# Patient Record
Sex: FEMALE | Race: BLACK OR AFRICAN AMERICAN | NOT HISPANIC OR LATINO
[De-identification: names, ages, dates, MRNs, and addresses within clinical notes are randomized per-mention and may not be internally consistent; named-entity substitution may affect disease eponyms.]

---

## 2020-11-09 ENCOUNTER — APPOINTMENT (OUTPATIENT)
Dept: SURGERY | Facility: CLINIC | Age: 45
End: 2020-11-09
Payer: COMMERCIAL

## 2020-11-09 VITALS
TEMPERATURE: 98.2 F | SYSTOLIC BLOOD PRESSURE: 151 MMHG | DIASTOLIC BLOOD PRESSURE: 85 MMHG | WEIGHT: 293 LBS | OXYGEN SATURATION: 98 % | HEART RATE: 77 BPM | BODY MASS INDEX: 41.48 KG/M2 | HEIGHT: 70.5 IN

## 2020-11-09 PROCEDURE — 99203 OFFICE O/P NEW LOW 30 MIN: CPT

## 2020-11-25 NOTE — PHYSICAL EXAM
[JVD] : no jugular venous distention  [Normal Breath Sounds] : Normal breath sounds [Normal Heart Sounds] : normal heart sounds [Abdominal Masses] : No abdominal masses [Abdomen Tenderness] : ~T ~M No abdominal tenderness [Tender] : was nontender [Enlarged] : not enlarged [Purpura] : no purpura  [Alert] : alert [Oriented to Person] : oriented to person [Oriented to Place] : oriented to place [Oriented to Time] : oriented to time [Calm] : calm [de-identified] : JOSHUA. Lesia. Appropriate. Comfortable. [de-identified] : Pupils equal. No Scleral Icterus. NCAT. +COVID-19 mask. [de-identified] : Supple. Trachea midline. No overt lymphadenopathy. No JVD [de-identified] : Abdomen is soft, non tender and non distended. Do not appreciate any overt mass. There is reducible hernia. Midline. No tenderness. Exam limited by body habitus.

## 2020-11-25 NOTE — HISTORY OF PRESENT ILLNESS
[de-identified] : This is a 45 year old female with a history of longstanding ventral hernia. She has a history of ventral/umbilical hernia repair in 2000. She reports noting a bulge in the upper area of the abdomen for some time. No obstructive symptoms but wishes to have it checked out. Hx of fibroids in the past. Hx of recurrent UTI. She suffers from morbid obesity with a BMI of 44, as well as chronic anemia.

## 2020-11-25 NOTE — ASSESSMENT
[FreeTextEntry1] : Pleasant 45 year old female with history of ventral hernia. She is a current everyday smoker and has a body mass index of 44. We discussed that at the current weight that surgery to repair has very high complication risk. GIven that she has had difficulty with weight for some time, I have recommended consultation with bariatric surgery for weight loss surgery in an effort to improve her overall health prior to any attempt at recurrent hernia repair or abdominal wall reconstruction. Notably greater than 50% of todays 30 minute initial visit was spent on counseling and coordination of her care. I have referred her to Dr. Chris Albrecht for evaluation.

## 2020-12-02 ENCOUNTER — APPOINTMENT (OUTPATIENT)
Dept: BARIATRICS | Facility: CLINIC | Age: 45
End: 2020-12-02

## 2021-05-07 ENCOUNTER — APPOINTMENT (OUTPATIENT)
Dept: OPHTHALMOLOGY | Facility: CLINIC | Age: 46
End: 2021-05-07

## 2021-08-31 ENCOUNTER — EMERGENCY (EMERGENCY)
Facility: HOSPITAL | Age: 46
LOS: 1 days | Discharge: HOME | End: 2021-08-31
Attending: EMERGENCY MEDICINE | Admitting: EMERGENCY MEDICINE
Payer: COMMERCIAL

## 2021-08-31 VITALS
RESPIRATION RATE: 18 BRPM | SYSTOLIC BLOOD PRESSURE: 165 MMHG | HEIGHT: 70 IN | DIASTOLIC BLOOD PRESSURE: 85 MMHG | HEART RATE: 108 BPM | TEMPERATURE: 98 F | OXYGEN SATURATION: 98 %

## 2021-08-31 DIAGNOSIS — M79.672 PAIN IN LEFT FOOT: ICD-10-CM

## 2021-08-31 DIAGNOSIS — Z88.1 ALLERGY STATUS TO OTHER ANTIBIOTIC AGENTS STATUS: ICD-10-CM

## 2021-08-31 DIAGNOSIS — M21.612 BUNION OF LEFT FOOT: ICD-10-CM

## 2021-08-31 PROCEDURE — 96372 THER/PROPH/DIAG INJ SC/IM: CPT

## 2021-08-31 PROCEDURE — 99283 EMERGENCY DEPT VISIT LOW MDM: CPT | Mod: 25

## 2021-08-31 PROCEDURE — 73630 X-RAY EXAM OF FOOT: CPT

## 2021-08-31 PROCEDURE — 99284 EMERGENCY DEPT VISIT MOD MDM: CPT

## 2021-08-31 PROCEDURE — 73630 X-RAY EXAM OF FOOT: CPT | Mod: 26,LT

## 2021-08-31 RX ORDER — KETOROLAC TROMETHAMINE 30 MG/ML
30 SYRINGE (ML) INJECTION ONCE
Refills: 0 | Status: DISCONTINUED | OUTPATIENT
Start: 2021-08-31 | End: 2021-08-31

## 2021-08-31 RX ORDER — IBUPROFEN 200 MG
1 TABLET ORAL
Qty: 30 | Refills: 0
Start: 2021-08-31

## 2021-08-31 RX ADMIN — Medication 30 MILLIGRAM(S): at 07:58

## 2021-08-31 RX ADMIN — Medication 30 MILLIGRAM(S): at 08:58

## 2021-08-31 NOTE — ED PROVIDER NOTE - OBJECTIVE STATEMENT
46 year old female with history of left foot bunion presents to ED with concern for bunion causing her discomfort over the past several days.  Patient states she has been shifting her weight and walking with a cane to keep pressure off of the painful area.  She denies direct trauma to area, paresthesias, inability to ambulate, fever, chills, pain to ankle/lower leg or any additional acute complaints or concerns at this time.  She took tylenol yesterday for her pain with minimal improvement in symptoms.

## 2021-08-31 NOTE — ED PROVIDER NOTE - NSFOLLOWUPINSTRUCTIONS_ED_ALL_ED_FT
Please follow up with podiatry team as well as your primary physician in 1-2 days for re evaluation.  Please return to ER immediately should your symptoms worsen or if you have any concern prior to this recommended follow up.          Bunion    WHAT YOU NEED TO KNOW:    A bunion is a bony lump at the base of your big toe. As it grows, it sticks out from the side of your foot and may move your toe out of place.    Bunion         DISCHARGE INSTRUCTIONS:    Return to the emergency department if:   •You have severe pain in your toe.      •You cannot put weight on your foot.      Call your doctor if:   •You cannot do your daily activities because of the pain.      •You have questions or concerns about your condition or care.      Medicines: You may need any of the following:  •Acetaminophen decreases pain and fever. It is available without a doctor's order. Ask how much to take and how often to take it. Follow directions. Read the labels of all other medicines you are using to see if they also contain acetaminophen, or ask your doctor or pharmacist. Acetaminophen can cause liver damage if not taken correctly. Do not use more than 4 grams (4,000 milligrams) total of acetaminophen in one day.       •NSAIDs, such as ibuprofen, help decrease swelling, pain, and fever. This medicine is available with or without a doctor's order. NSAIDs can cause stomach bleeding or kidney problems in certain people. If you take blood thinner medicine, always ask your healthcare provider if NSAIDs are safe for you. Always read the medicine label and follow directions.      •Take your medicine as directed. Contact your healthcare provider if you think your medicine is not helping or if you have side effects. Tell him of her if you are allergic to any medicine. Keep a list of the medicines, vitamins, and herbs you take. Include the amounts, and when and why you take them. Bring the list or the pill bottles to follow-up visits. Carry your medicine list with you in case of an emergency.      Self-care:   •Use a bunion pad. Wear a thick, ring-shaped pad around and over the bunion to cushion it.      •Wear shoes that fit well. Wear wide, low-heeled shoes that have plenty of room for your toes. Do not wear tight shoes or heels that are higher than 2 inches.      •Wear shoe inserts or arch supports. These will decrease pressure on the bunion.      •Separate your big toe at night. Separate the big toe from the others with a foam pad while you sleep. Use a light elastic bandage to keep the pad in place.      •Stretch your foot each day. This will help decrease pressure and increase foot strength. Ask what foot exercises are best for you.      •Apply ice on your toe for 15 to 20 minutes every hour or as directed. Use an ice pack or put crushed ice in a plastic bag. Cover it with a towel. Ice helps prevent tissue damage and decreases swelling and pain.      •Go to physical therapy if directed. A physical therapist teaches you exercises to help improve movement and strength, and to decrease pain.      Follow up with your doctor as directed: You may be referred to a podiatrist (foot specialist). Write down your questions so you remember to ask them during your visits.        © Copyright [employer] 2021           back to top                          © Copyright [employer] 2021

## 2021-08-31 NOTE — ED PROVIDER NOTE - PHYSICAL EXAMINATION
VITAL SIGNS: I have reviewed nursing notes and confirm.  CONSTITUTIONAL: Non toxic; in no acute distress.   SKIN:  Warm and dry, no acute rash.   HEAD:  Normocephalic, atraumatic.  EYES: PERRL.  EOM intact; conjunctiva and sclera clear.  ENT: No nasal discharge; airway clear.   NECK: Supple; non tender.  CARD: S1, S2 normal; no murmurs, gallops, or rubs. Regular rate and rhythm.   RESP:  Clear to auscultation b/l, no wheezes, rales or rhonchi.  ABD: Normal bowel sounds; soft; non-distended; non-tender; no guarding/rebound.  EXT: + TTP over medial aspect of left distal foot with mild overlying erythema, no skin breakdown, no fluctuance.  No pain on palpation to ankle/mid/proximal foot, tib/fib on affected LLE.  Normal ROM. No clubbing, cyanosis or edema. 2+ pulses to b/l ue/le.  NEURO: Alert, oriented, grossly unremarkable.  5/5 strength x 4 extremities against gravity and external force.  No drift x 4 extremities.  Sensation intact and symmetric x 4 extremities.  No facial asymmetry.    PSYCH: Cooperative, mood and affect appropriate.

## 2021-08-31 NOTE — ED PROVIDER NOTE - NSFOLLOWUPCLINICS_GEN_ALL_ED_FT
[facility] Podiatry [facility]  Podiatry  [address] [place] [address]  Phone: [phone]  Fax:

## 2021-08-31 NOTE — ED ADULT NURSE NOTE - OBJECTIVE STATEMENT
46Y Female A&OX3 c/o Left foot pain starting Saturday. Pain worse with ambulation. Lefty big toe Hallux noted to be swollen and erythematous. Pt has a hx of Gout. Last "many years ago". Pt able to ambulate with a guerra. Edema noted to Left lower extremity. Denies numbness, tingling, fever, chills, cough, trauma, nor chest pain. Plus 2 bilateral pedal pulses.

## 2021-08-31 NOTE — ED PROVIDER NOTE - PATIENT PORTAL LINK FT
You can access the FollowMyHealth Patient Portal offered by [facility] by registering at the following website: [email or web address]. By joining [employer]’s FollowMyHealth portal, you will also be able to view your health information using other applications (apps) compatible with our system.

## 2021-08-31 NOTE — ED PROVIDER NOTE - CLINICAL SUMMARY MEDICAL DECISION MAKING FREE TEXT BOX
Patient presents to ED with concern for left foot pain over area of known bunion over the past several days.  Neurovasc status intact, ambulalting with cane to offset discomfort to medial distal left foot.  Patient is given toradol in ED and x ray left foot completed.  No acute process on imaging.  Will provide follow up information for podiatry, rx for motrin, post op shoe to help distribute weight and recommend prompt follow up for re eval with specialist.  Patient is advised to follow up with podiatrist as well as their PCP in 1-2 days without fail.  Patient instructed to return to ED immediately should their symptoms worsen or if there is any concern prior to the recommended follow up.  Patient is aware of plan and verbalizes their understanding.  Will discharge home at this time.

## 2021-08-31 NOTE — ED PROVIDER NOTE - NS ED ROS FT
Constitutional: No fever or chills.   Eyes: No pain, blurry vision, or discharge.  ENMT: No hearing changes, pain, discharge or infections. No neck pain or stiffness.  Cardiac: No chest pain, SOB or edema. No chest pain with exertion.  Respiratory: No cough or respiratory distress. No hemoptysis. No history of asthma or RAD.  GI: No nausea, vomiting, diarrhea or abdominal pain.  : No dysuria, frequency or burning.  MS: + Left foot pain.  No back or neck pain.    Neuro: No headache or weakness. No LOC.  Skin: No skin rash.   Endocrine: No history of thyroid disease or diabetes.  Except as documented in the HPI, all other systems are negative.

## 2021-11-04 ENCOUNTER — APPOINTMENT (OUTPATIENT)
Dept: PULMONOLOGY | Facility: CLINIC | Age: 46
End: 2021-11-04

## 2022-05-02 ENCOUNTER — NON-APPOINTMENT (OUTPATIENT)
Age: 47
End: 2022-05-02

## 2022-05-02 ENCOUNTER — APPOINTMENT (OUTPATIENT)
Dept: OPHTHALMOLOGY | Facility: CLINIC | Age: 47
End: 2022-05-02
Payer: COMMERCIAL

## 2022-05-02 PROCEDURE — 92004 COMPRE OPH EXAM NEW PT 1/>: CPT

## 2022-05-20 ENCOUNTER — APPOINTMENT (OUTPATIENT)
Age: 47
End: 2022-05-20
Payer: COMMERCIAL

## 2022-05-20 ENCOUNTER — NON-APPOINTMENT (OUTPATIENT)
Age: 47
End: 2022-05-20

## 2022-05-20 ENCOUNTER — APPOINTMENT (OUTPATIENT)
Dept: OPHTHALMOLOGY | Facility: CLINIC | Age: 47
End: 2022-05-20
Payer: COMMERCIAL

## 2022-05-20 PROCEDURE — 99202 OFFICE O/P NEW SF 15 MIN: CPT | Mod: 95

## 2022-05-20 PROCEDURE — 92014 COMPRE OPH EXAM EST PT 1/>: CPT

## 2022-05-23 NOTE — CONSULT LETTER
[Dear  ___] : Dear  [unfilled], [Consult Letter:] : I had the pleasure of evaluating your patient, [unfilled]. [Please see my note below.] : Please see my note below. [Consult Closing:] : Thank you very much for allowing me to participate in the care of this patient.  If you have any questions, please do not hesitate to contact me. [Sincerely,] : Sincerely, [FreeTextEntry3] : Benji Stone MD, FSIR\par Chief Interventional Radiology\par [facility]\par [facility]\par

## 2022-05-23 NOTE — ASSESSMENT
[FreeTextEntry1] : 46 year old with heavy irregular periods in setting of adenomyosis and uterine fibroids.  Patient is a candidate for UAE.  Will obtain clearance from PMD prior to procedure given history and comorbidities

## 2022-05-23 NOTE — HISTORY OF PRESENT ILLNESS
[FreeTextEntry1] : Tosin Solomon is a 46 year old  with over 10 year history of menorrhagia secondary to adenomyosis and fibroids resulting in anemia requiring iron infusion.  Also reports urinary frequency nocturia and pelvic pressure.   MRI shows dominant adenomyosis with several small fibroids.  Pap negative. [Home] : at home, [unfilled] , at the time of the visit. [Medical Office: ([facility])___] : at the medical office located in  [Verbal consent obtained from patient] : the patient, [unfilled] [Menorrhagia] : ~T menorrhagia [Urinary Frequency] : urinary frequency [Pressure] : pressure [Bloating] : bloating [Myomectomy] : no myomectomy [UAE] : no uterine artery embolization [Hysteroscopy] : no hysteroscopy [Last Menstrual Period (___)] : Last menstrual period [unfilled] [Monthly Cycles Regular] : monthly cycles are not regular [Lesions/ Discharge] : no lesions and or discharge [Menopausal Symptoms] : no complaints of menopausal symptoms [Post Menopause] : not post menopausal [G ___] : G [unfilled] [P___] : P [unfilled] [Vaginal___] : vaginal [unfilled] [Plans for future pregnancies within 2 years] : does not plan to have future pregnancies within 2 years [Surgically Sterile] : not surgically sterile [Stable] : stable

## 2022-11-03 ENCOUNTER — APPOINTMENT (OUTPATIENT)
Dept: BARIATRICS | Facility: CLINIC | Age: 47
End: 2022-11-03

## 2023-01-23 ENCOUNTER — APPOINTMENT (OUTPATIENT)
Dept: SURGERY | Facility: CLINIC | Age: 48
End: 2023-01-23

## 2023-03-19 ENCOUNTER — EMERGENCY (EMERGENCY)
Facility: HOSPITAL | Age: 48
LOS: 1 days | Discharge: HOME | End: 2023-03-19
Attending: EMERGENCY MEDICINE | Admitting: EMERGENCY MEDICINE
Payer: COMMERCIAL

## 2023-03-19 VITALS
WEIGHT: 293 LBS | TEMPERATURE: 98 F | OXYGEN SATURATION: 98 % | DIASTOLIC BLOOD PRESSURE: 88 MMHG | HEIGHT: 70 IN | RESPIRATION RATE: 18 BRPM | SYSTOLIC BLOOD PRESSURE: 145 MMHG | HEART RATE: 98 BPM

## 2023-03-19 VITALS
OXYGEN SATURATION: 96 % | DIASTOLIC BLOOD PRESSURE: 75 MMHG | SYSTOLIC BLOOD PRESSURE: 150 MMHG | TEMPERATURE: 98 F | RESPIRATION RATE: 18 BRPM | HEART RATE: 55 BPM

## 2023-03-19 LAB
ALBUMIN SERPL ELPH-MCNC: 3.8 G/DL — SIGNIFICANT CHANGE UP (ref 3.3–5)
ALP SERPL-CCNC: 71 U/L — SIGNIFICANT CHANGE UP (ref 40–120)
ALT FLD-CCNC: 15 U/L — SIGNIFICANT CHANGE UP (ref 10–45)
ANION GAP SERPL CALC-SCNC: 9 MMOL/L — SIGNIFICANT CHANGE UP (ref 5–17)
APPEARANCE UR: CLEAR — SIGNIFICANT CHANGE UP
AST SERPL-CCNC: 14 U/L — SIGNIFICANT CHANGE UP (ref 10–40)
BACTERIA # UR AUTO: PRESENT /HPF
BASOPHILS # BLD AUTO: 0.03 K/UL — SIGNIFICANT CHANGE UP (ref 0–0.2)
BASOPHILS NFR BLD AUTO: 0.5 % — SIGNIFICANT CHANGE UP (ref 0–2)
BILIRUB SERPL-MCNC: 0.2 MG/DL — SIGNIFICANT CHANGE UP (ref 0.2–1.2)
BILIRUB UR-MCNC: NEGATIVE — SIGNIFICANT CHANGE UP
BUN SERPL-MCNC: 13 MG/DL — SIGNIFICANT CHANGE UP (ref 7–23)
CALCIUM SERPL-MCNC: 9.2 MG/DL — SIGNIFICANT CHANGE UP (ref 8.4–10.5)
CHLORIDE SERPL-SCNC: 103 MMOL/L — SIGNIFICANT CHANGE UP (ref 96–108)
CO2 SERPL-SCNC: 27 MMOL/L — SIGNIFICANT CHANGE UP (ref 22–31)
COLOR SPEC: YELLOW — SIGNIFICANT CHANGE UP
CREAT SERPL-MCNC: 0.74 MG/DL — SIGNIFICANT CHANGE UP (ref 0.5–1.3)
DIFF PNL FLD: ABNORMAL
EGFR: 100 ML/MIN/1.73M2 — SIGNIFICANT CHANGE UP
EOSINOPHIL # BLD AUTO: 0.1 K/UL — SIGNIFICANT CHANGE UP (ref 0–0.5)
EOSINOPHIL NFR BLD AUTO: 1.6 % — SIGNIFICANT CHANGE UP (ref 0–6)
EPI CELLS # UR: SIGNIFICANT CHANGE UP /HPF (ref 0–5)
GLUCOSE SERPL-MCNC: 145 MG/DL — HIGH (ref 70–99)
GLUCOSE UR QL: NEGATIVE — SIGNIFICANT CHANGE UP
HCT VFR BLD CALC: 39 % — SIGNIFICANT CHANGE UP (ref 34.5–45)
HGB BLD-MCNC: 12.6 G/DL — SIGNIFICANT CHANGE UP (ref 11.5–15.5)
IMM GRANULOCYTES NFR BLD AUTO: 0.2 % — SIGNIFICANT CHANGE UP (ref 0–0.9)
KETONES UR-MCNC: NEGATIVE — SIGNIFICANT CHANGE UP
LEUKOCYTE ESTERASE UR-ACNC: NEGATIVE — SIGNIFICANT CHANGE UP
LIDOCAIN IGE QN: 15 U/L — SIGNIFICANT CHANGE UP (ref 7–60)
LYMPHOCYTES # BLD AUTO: 2.12 K/UL — SIGNIFICANT CHANGE UP (ref 1–3.3)
LYMPHOCYTES # BLD AUTO: 33.2 % — SIGNIFICANT CHANGE UP (ref 13–44)
MCHC RBC-ENTMCNC: 25.1 PG — LOW (ref 27–34)
MCHC RBC-ENTMCNC: 32.3 GM/DL — SIGNIFICANT CHANGE UP (ref 32–36)
MCV RBC AUTO: 77.7 FL — LOW (ref 80–100)
MONOCYTES # BLD AUTO: 0.35 K/UL — SIGNIFICANT CHANGE UP (ref 0–0.9)
MONOCYTES NFR BLD AUTO: 5.5 % — SIGNIFICANT CHANGE UP (ref 2–14)
NEUTROPHILS # BLD AUTO: 3.77 K/UL — SIGNIFICANT CHANGE UP (ref 1.8–7.4)
NEUTROPHILS NFR BLD AUTO: 59 % — SIGNIFICANT CHANGE UP (ref 43–77)
NITRITE UR-MCNC: NEGATIVE — SIGNIFICANT CHANGE UP
NRBC # BLD: 0 /100 WBCS — SIGNIFICANT CHANGE UP (ref 0–0)
PH UR: 7 — SIGNIFICANT CHANGE UP (ref 5–8)
PLATELET # BLD AUTO: 299 K/UL — SIGNIFICANT CHANGE UP (ref 150–400)
POTASSIUM SERPL-MCNC: 4.8 MMOL/L — SIGNIFICANT CHANGE UP (ref 3.5–5.3)
POTASSIUM SERPL-SCNC: 4.8 MMOL/L — SIGNIFICANT CHANGE UP (ref 3.5–5.3)
PROT SERPL-MCNC: 6.9 G/DL — SIGNIFICANT CHANGE UP (ref 6–8.3)
PROT UR-MCNC: NEGATIVE MG/DL — SIGNIFICANT CHANGE UP
RBC # BLD: 5.02 M/UL — SIGNIFICANT CHANGE UP (ref 3.8–5.2)
RBC # FLD: 15.6 % — HIGH (ref 10.3–14.5)
RBC CASTS # UR COMP ASSIST: > 10 /HPF
SODIUM SERPL-SCNC: 139 MMOL/L — SIGNIFICANT CHANGE UP (ref 135–145)
SP GR SPEC: 1.02 — SIGNIFICANT CHANGE UP (ref 1–1.03)
UROBILINOGEN FLD QL: 0.2 E.U./DL — SIGNIFICANT CHANGE UP
WBC # BLD: 6.38 K/UL — SIGNIFICANT CHANGE UP (ref 3.8–10.5)
WBC # FLD AUTO: 6.38 K/UL — SIGNIFICANT CHANGE UP (ref 3.8–10.5)
WBC UR QL: < 5 /HPF — SIGNIFICANT CHANGE UP

## 2023-03-19 PROCEDURE — 76705 ECHO EXAM OF ABDOMEN: CPT | Mod: 26

## 2023-03-19 PROCEDURE — 83690 ASSAY OF LIPASE: CPT

## 2023-03-19 PROCEDURE — 99284 EMERGENCY DEPT VISIT MOD MDM: CPT

## 2023-03-19 PROCEDURE — 81001 URINALYSIS AUTO W/SCOPE: CPT

## 2023-03-19 PROCEDURE — 96374 THER/PROPH/DIAG INJ IV PUSH: CPT

## 2023-03-19 PROCEDURE — 80053 COMPREHEN METABOLIC PANEL: CPT

## 2023-03-19 PROCEDURE — 85025 COMPLETE CBC W/AUTO DIFF WBC: CPT

## 2023-03-19 PROCEDURE — 76705 ECHO EXAM OF ABDOMEN: CPT

## 2023-03-19 PROCEDURE — 36415 COLL VENOUS BLD VENIPUNCTURE: CPT

## 2023-03-19 PROCEDURE — 99284 EMERGENCY DEPT VISIT MOD MDM: CPT | Mod: 25

## 2023-03-19 RX ORDER — OXYCODONE AND ACETAMINOPHEN 5; 325 MG/1; MG/1
1 TABLET ORAL
Qty: 9 | Refills: 0
Start: 2023-03-19 | End: 2023-03-21

## 2023-03-19 RX ORDER — SODIUM CHLORIDE 9 MG/ML
1000 INJECTION INTRAMUSCULAR; INTRAVENOUS; SUBCUTANEOUS ONCE
Refills: 0 | Status: COMPLETED | OUTPATIENT
Start: 2023-03-19 | End: 2023-03-19

## 2023-03-19 RX ORDER — ACETAMINOPHEN 500 MG
1000 TABLET ORAL ONCE
Refills: 0 | Status: COMPLETED | OUTPATIENT
Start: 2023-03-19 | End: 2023-03-19

## 2023-03-19 RX ORDER — ONDANSETRON 8 MG/1
1 TABLET, FILM COATED ORAL
Qty: 9 | Refills: 0
Start: 2023-03-19 | End: 2023-03-21

## 2023-03-19 RX ORDER — FAMOTIDINE 10 MG/ML
1 INJECTION INTRAVENOUS
Qty: 14 | Refills: 0
Start: 2023-03-19 | End: 2023-03-25

## 2023-03-19 RX ADMIN — Medication 400 MILLIGRAM(S): at 13:31

## 2023-03-19 RX ADMIN — SODIUM CHLORIDE 1000 MILLILITER(S): 9 INJECTION INTRAMUSCULAR; INTRAVENOUS; SUBCUTANEOUS at 13:30

## 2023-03-19 NOTE — ED PROVIDER NOTE - PATIENT PORTAL LINK FT
You can access the FollowMyHealth Patient Portal offered by [facility] by registering at the following website: [email or web address]. By joining [employer]’s FollowMyHealth portal, you will also be able to view your health information using other applications (apps) compatible with our system.

## 2023-03-19 NOTE — ED PROVIDER NOTE - ATTENDING APP SHARED VISIT CONTRIBUTION OF CARE
48 yo F h/o gb adenomyosis c/o severe RUQ pain starting overnight and progressing this am w n/v, no change in bowel habits.  Pain feels "like acid pouring on me" w burning pain.  No dysuria, hematuria.  Pt reports meeting w a surgeon - no stones but + adenomyosis and no indication for cholecystectomy at time of eval.  Pt reports she ate fried fish last night.  No fever.  Well appearing, nad, nc/at, lung cta, heart reg, abd soft, RUQ ttp, ext no gross deformity, no gross neuro deficits  Pt c/o ruq pain, h/o adenomyosis; sx concerning for biliary colic vs cholecystitis.  No sig concern for kidney stone, uti, pyelo.  Plan labs, pain meds, u/s; reassess.

## 2023-03-19 NOTE — ED PROVIDER NOTE - NSFOLLOWUPINSTRUCTIONS_ED_ALL_ED_FT
[place] Diet  A bland diet may consist of soft foods or foods that are not high in fat or are not greasy, acidic, or spicy. Avoiding certain foods may cause less irritation to your mouth, throat, stomach, or gastrointestinal tract. Avoiding certain foods may make you feel better. Everyone's tolerances are different. A bland diet should be based on what you can tolerate and what may cause discomfort.  What is my plan  Your health care provider or dietitian may recommend specific changes to your diet to treat your symptoms. These changes may include:  •Eating small meals frequently.  •Cooking food until it is soft enough to chew easily.  •Taking the time to chew your food thoroughly, so it is easy to swallow and digest  •Avoiding foods that cause you discomfort. These may include spicy food, fried food, greasy foods, hard-to-chew foods, or citrus fruits and juices.  •Drinking slowly.  What are tips for following this plan?  Reading food labels   •To reduce fiber intake, look for food labels that say "whole," such as whole wheat or whole grain.  Shopping   •Avoid food items that may have nuts or seeds.  •Avoid vegetables that may make you gassy or have a tough texture, such as broccoli, cauliflower, or corn.  Cooking   •Cook foods thoroughly so they have a soft texture.  Meal planning   •Make sure you include foods from all food groups to eat a balanced diet.  •Eat a variety of types of foods.  •Eat foods and drink beverages that do not cause you discomfort. These may include soups and broths with cooked meats, pasta, and vegetables.  Lifestyle   •Sit up after meals, avoid tight clothing, and take time to eat and chew your food slowly.  •Ask your health care provider whether you should take dietary supplements.  General information   •Mildly season your foods. Some seasonings, such as cayenne pepper, vinegar, or hot sauce, may cause irritation.  •The foods, beverages, or seasonings to avoid should be based on individual tolerance.  What foods should I eat?  Fruits   Canned or cooked fruit such as peaches, pears, or applesauce. Bananas.  Vegetables   Well-cooked vegetables. Canned or cooked vegetables such as carrots, green beans, beets, or spinach. Mashed or boiled potatoes.  Grains   Bread, rice, and cereal from the grain group.   Hot cereals, such as cream of wheat and processed oatmeal. Rice. Bread, crackers, pasta, or tortillas made from refined white flour.  Meats and other proteins   Chicken, fish, and eggs.   Eggs. Creamy peanut butter or other nut butters. Lean, well-cooked tender meats, such as beef, pork, chicken, or fish  Dairy   Low-fat dairy products such as milk, cottage cheese, or yogurt.  Beverages   A cup of hot tea.   Water. Herbal tea. Apple juice.  Fats and oils   Mild salad dressings. Canola or olive oil.  Sweets and desserts   Low-fat pudding, custard, or ice cream. Fruit gelatin.  The items listed above may not be a complete list of foods and beverages you can eat. Contact a dietitian for more information.   What foods should I avoid?  Fruits   Citrus fruits, such as oranges and grapefruit. Fruits with a stringy texture. Fruits that have lots of seeds, such as kiwi or strawberries. Dried fruits.  Vegetables  Raw, uncooked vegetables. Salads.  Grains   Whole grain breads, muffins, and cereals.  Meats and other proteins   Tough, fibrous meats. Highly seasoned meat such as corned beef, smoked meats, or fish. Processed high-fat meats such as brats, hot dogs, or sausage.  Dairy  Full-fat dairy foods such as ice cream and cheese.  Beverages   Caffeinated drinks. Alcohol  Seasonings and condiments   Strongly flavored seasonings or condiments. Hot sauce. Salsa.  Other foods   Spicy foods. Fried or greasy foods. Sour foods, such as pickled or fermented foods like sauerkraut. Foods high in fiber.  The items listed above may not be a complete list of foods and beverages you should avoid. Contact a dietitian for more information.   Summary  •A bland diet should be based on individual tolerance. It may consist of foods that are soft textured and do not have a lot of fat, fiber, acid, or seasonings.  •A bland diet may be recommended because avoiding certain foods, beverages, or spices may make you feel better.

## 2023-03-19 NOTE — ED PROVIDER NOTE - NS ED ATTENDING STATEMENT MOD
This was a shared visit with the FRANCINE. I reviewed and verified the documentation and independently performed the documented:

## 2023-03-19 NOTE — ED ADULT NURSE NOTE - OBJECTIVE STATEMENT
Pt. a&ox4 ambulatory with steady gait, hx of cholecystitis 3 yrs ago, mild exacerbation in January, hx of HTN and DM2, not on any meds, comes to ED c/o RUQ abdominal pain that began last night and intensified into this am, with + nausea and increased GERD / flatulence, made worse with eating and drinking, tender to palpation, and described as a constant throbbing sensation, feels similarly to exacerbation 3 yrs ago. Pt. denies radiation of pain, v/d, urinary s/s, cp, SOB, f/c, BA. Pt. notes she in on day 2 of Augmentin that UC prescribed for swollen glands pt. has had for last week. Pt. recalls she was also on Augmentin when cholecystitis occurred 3 yr ago. Abdomen is soft, tender to palpation.

## 2023-03-19 NOTE — ED PROVIDER NOTE - OBJECTIVE STATEMENT
The pt is 46 y/o F, who presents to ED c/o R sided abd pain since last night. Pain is 9/10, cramp like, + nausea, has not taken any pain meds, pain radiates to R upper back, ate fried fish and pasta salad last night, hx of cholecystitis in past (idiopathic - no gallstones). Denies fevers, chills, cp, sob, emesis, diarrhea, anorexia, dysuria.

## 2023-03-19 NOTE — ED PROVIDER NOTE - CLINICAL SUMMARY MEDICAL DECISION MAKING FREE TEXT BOX
pt c/o ruq pain after eating fried fish and pasta salad, + nausea, hx of idiopathic cholecystitis in past. no fever, no emesis, pt well appearing, neg gonzáles's sign, labs wnl, no cva tend, ua neg for inf, us neg for gallstones and no renal calculi or hydro. pain controlled w/iv tyl, stable for dc, bland diet and gi f/u discussed, pt understands and agrees w/plan, strict return precautions given

## 2023-03-23 DIAGNOSIS — Z88.1 ALLERGY STATUS TO OTHER ANTIBIOTIC AGENTS STATUS: ICD-10-CM

## 2023-03-23 DIAGNOSIS — Z87.440 PERSONAL HISTORY OF URINARY (TRACT) INFECTIONS: ICD-10-CM

## 2023-03-23 DIAGNOSIS — R11.0 NAUSEA: ICD-10-CM

## 2023-03-23 DIAGNOSIS — R10.11 RIGHT UPPER QUADRANT PAIN: ICD-10-CM

## 2023-03-23 DIAGNOSIS — Z87.42 PERSONAL HISTORY OF OTHER DISEASES OF THE FEMALE GENITAL TRACT: ICD-10-CM
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
## 2023-03-23 DIAGNOSIS — K82.8 OTHER SPECIFIED DISEASES OF GALLBLADDER: ICD-10-CM
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
## 2023-03-23 DIAGNOSIS — R10.9 UNSPECIFIED ABDOMINAL PAIN: ICD-10-CM

## 2023-03-23 DIAGNOSIS — E66.9 OBESITY, UNSPECIFIED: ICD-10-CM

## 2023-03-23 DIAGNOSIS — T78.1XXA OTHER ADVERSE FOOD REACTIONS, NOT ELSEWHERE CLASSIFIED, INITIAL ENCOUNTER: ICD-10-CM

## 2023-03-23 DIAGNOSIS — Z87.19 PERSONAL HISTORY OF OTHER DISEASES OF THE DIGESTIVE SYSTEM: ICD-10-CM

## 2023-07-10 ENCOUNTER — APPOINTMENT (OUTPATIENT)
Dept: SURGERY | Facility: CLINIC | Age: 48
End: 2023-07-10

## 2023-08-10 ENCOUNTER — APPOINTMENT (OUTPATIENT)
Dept: BARIATRICS | Facility: CLINIC | Age: 48
End: 2023-08-10

## 2023-08-16 ENCOUNTER — APPOINTMENT (OUTPATIENT)
Dept: BARIATRICS | Facility: CLINIC | Age: 48
End: 2023-08-16

## 2024-02-05 ENCOUNTER — APPOINTMENT (OUTPATIENT)
Dept: SURGERY | Facility: CLINIC | Age: 49
End: 2024-02-05
Payer: COMMERCIAL

## 2024-02-05 VITALS
HEART RATE: 123 BPM | HEIGHT: 70.5 IN | SYSTOLIC BLOOD PRESSURE: 144 MMHG | OXYGEN SATURATION: 98 % | BODY MASS INDEX: 41.48 KG/M2 | WEIGHT: 293 LBS | DIASTOLIC BLOOD PRESSURE: 84 MMHG | TEMPERATURE: 97.2 F

## 2024-02-05 DIAGNOSIS — E11.9 TYPE 2 DIABETES MELLITUS W/OUT COMPLICATIONS: ICD-10-CM

## 2024-02-05 DIAGNOSIS — D25.9 LEIOMYOMA OF UTERUS, UNSPECIFIED: ICD-10-CM

## 2024-02-05 DIAGNOSIS — I63.9 CEREBRAL INFARCTION, UNSPECIFIED: ICD-10-CM

## 2024-02-05 DIAGNOSIS — Z86.711 PERSONAL HISTORY OF PULMONARY EMBOLISM: ICD-10-CM

## 2024-02-05 DIAGNOSIS — E66.01 MORBID (SEVERE) OBESITY DUE TO EXCESS CALORIES: ICD-10-CM

## 2024-02-05 DIAGNOSIS — Z82.49 FAMILY HISTORY OF ISCHEMIC HEART DISEASE AND OTHER DISEASES OF THE CIRCULATORY SYSTEM: ICD-10-CM

## 2024-02-05 DIAGNOSIS — G93.2 BENIGN INTRACRANIAL HYPERTENSION: ICD-10-CM

## 2024-02-05 DIAGNOSIS — R10.11 RIGHT UPPER QUADRANT PAIN: ICD-10-CM

## 2024-02-05 DIAGNOSIS — K43.2 INCISIONAL HERNIA W/OUT OBSTRUCTION OR GANGRENE: ICD-10-CM

## 2024-02-05 DIAGNOSIS — Z78.9 OTHER SPECIFIED HEALTH STATUS: ICD-10-CM

## 2024-02-05 DIAGNOSIS — I10 ESSENTIAL (PRIMARY) HYPERTENSION: ICD-10-CM

## 2024-02-05 DIAGNOSIS — F17.200 NICOTINE DEPENDENCE, UNSPECIFIED, UNCOMPLICATED: ICD-10-CM

## 2024-02-05 DIAGNOSIS — D64.9 ANEMIA, UNSPECIFIED: ICD-10-CM

## 2024-02-05 DIAGNOSIS — N80.03 ADENOMYOSIS OF THE UTERUS: ICD-10-CM

## 2024-02-05 DIAGNOSIS — Z82.3 FAMILY HISTORY OF STROKE: ICD-10-CM

## 2024-02-05 DIAGNOSIS — D13.5 BENIGN NEOPLASM OF EXTRAHEPATIC BILE DUCTS: ICD-10-CM

## 2024-02-05 PROCEDURE — 99204 OFFICE O/P NEW MOD 45 MIN: CPT

## 2024-02-05 RX ORDER — SPIRONOLACTONE 25 MG/1
25 TABLET ORAL
Refills: 0 | Status: ACTIVE | COMMUNITY

## 2024-02-05 NOTE — HISTORY OF PRESENT ILLNESS
[de-identified] : Ms. Solomon presented today for evaluation and management of a recurrent ventral hernia.  She stated the hernia has been present for 1-2 years.  She first noticed the hernia as a bulge.  She noted pain of the area, which is intermittent.  She stated the hernia is enlarging.  She also presented today for evaluation and management of right upper quadrant pain of unclear etiology.  She stated the pain has been present intermittently for 3 years.  She stated the pain has been happening more frequently the last 3-4 months.  The pain radiates into the back.  She stated food is not specifically a trigger for the pain, and the pain occurs spontaneously.  She stated the pain happens more often at night but can happen at other times of the day.  She denied associated fever, chills, nausea, vomiting, diarrhea, or constipation.  She stated she was diagnosed with diabetes in April 2023.  She plans to start Ozempic this weekend.

## 2024-02-05 NOTE — PHYSICAL EXAM
[Calm] : calm [de-identified] : NAD, comfortable [de-identified] : NCAT, no scleral icterus  [de-identified] : obese, soft, and non-tender.  Well-healed supraumbilical midline incision from her prior ventral hernia repair.  There is a mass in the abdomen above and to the left of the umbilicus that does not change dramatically with Valsalva maneuver, although the examination is limited by her body habitus.   [de-identified] : No clubbing, cyanosis, or edema.  [de-identified] : Warm, dry.  [de-identified] : A&Ox3

## 2024-02-05 NOTE — CONSULT LETTER
[FreeTextEntry1] : 2024    [facility] [address] [place] [address] Telephone #: [phone]   Re: Tosin Solomon : 1975   Dear Ms. Abbasi:  I had the opportunity to see Ms. Solomon today for evaluation and management of a recurrent ventral hernia.  She stated the hernia has been present for 1-2 years.  She first noticed the hernia as a bulge.  She noted pain of the area, which is intermittent.  She stated the hernia is enlarging.  She also presented today for evaluation and management of right upper quadrant pain of unclear etiology.  She stated the pain has been present intermittently for 3 years.  She stated the pain has been happening more frequently the last 3-4 months.  The pain radiates into the back.  She stated food is not specifically a trigger for the pain, and the pain occurs spontaneously.  She stated the pain happens more often at night but can happen at other times of the day.  She denied associated fever, chills, nausea, vomiting, diarrhea, or constipation.  She stated she was diagnosed with diabetes in 2023.  She plans to start Ozempic this weekend.  On physical examination, her height is 5 feet 10.5 inches, her weight is 307 pounds, and BMI 43.43.  Her temperature is 97.2 F, blood pressure is 144/84, heart rate 123, and O2 saturation is 98% on room air.  In general, she is a well-dressed, well-nourished woman who appears her stated age and is in no acute distress.  She is calm, alert and oriented x 3 and calm.  HEENT exam demonstrates no scleral icterus and a normocephalic atraumatic appearance.  Her abdomen is obese, soft, and non-tender.  There is a well-healed supraumbilical midline incision from her prior ventral hernia repair.  There is a mass in the abdomen above and to the left of the umbilicus that does not change dramatically with Valsalva maneuver, although the examination is limited by her body habitus.  Her extremities are warm and dry without clubbing, cyanosis or edema.    The following imaging studies were reviewed: CT abdomen/pelvis (4/3/2013) - No evidence of ureteral stone or hydronephrosis.  Hepatomegaly. Limited evaluation of the mostly contracted gallbladder showing gallstones.  No pericholecystic inflammatory changes. Additional diagnostic information regarding the gallbladder may be obtained with a right upper quadrant ultrasound following overnight fasting, if clinically indicated.  Enlarged fibroid uterus. Endometrial thickening cannot be excluded.  Follow up nonemergent pelvic ultrasound is recommended when clinically appropriate.  MRI abdomen (2013) - the tip of the gallbladder is contracted, representing a phrygian cap.   Adenomyosis was visible in this region on the ultrasound. Both CT scans demonstrated a small gallstone in this region. The stone is difficult to appreciate on the MRI. The common bile duct is normal in diameter. No filling defects are seen within the common bile duct. The pancreatic duct is normal in diameter and inserts onto the major papilla. There is a 2.0 x 1.5 cm left adrenal nodule which cannot be further characterized since the study was focused on the gallbladder. The right adrenal is normal in appearance. Tiny renal cysts are visible.  HIDA (2013) - prompt uptake of radiopharmaceutical by the liver with prompt excretion into the biliary tree, bowel and gallbladder. Normal hepatobiliary scan. There is no evidence of acute cholecystitis.  US abdomen (2016) - Gallbladder adenomyomatosis.  MRI pelvis (2022) - small fat-containing umbilical hernia trace physiologic free cul-de-sac fluid.  Enlarged bulbous uterus with markedly severe adenomyosis. A few scattered discrete small fibroids are noted, the largest 2.6 cm, the others <1.5 cm.  Intrinsically unremarkable endometrium and ovaries given the lack of IV contrast.  US abdomen (3/19/2023) - normal caliber common bile duct, measuring 4 mm.  Adenomyomatosis of the gallbladder. No stones or gallbladder wall thickening. No sonographic Pagan's sign.  No acute sonographic abnormality.  Redemonstrated gallbladder adenomyomatosis.  In summary, Ms. Solomon is a 48-year-old woman with a recurrent ventral hernia and right upper quadrant pain of unclear etiology.  It is unclear whether a cholecystectomy would improve her right-sided pain, as the symptoms do not entirely go along with a gallbladder etiology.  As she has not had a CT abdomen/pelvis recently to evaluate the hernia, we will perform a CT abdomen/pelvis to further evaluate the hernia.  We discussed the risks, benefits, and alternatives to surgery, including but not limited to bleeding, infection, mesh complications, hernia recurrence, and damage to the surrounding structures, and how BMI, smoking, and diabetes will impact these risks.  We discussed the need for significant weight loss (to a BMI of under 35) prior to repair, especially since this is a recurrent hernia, and the need for smoking cessation for 8 weeks prior to surgery, as well as the need for good glycemic control with a target HgA1C of 7.2 or less.  She was also referred to Dr. Lou Henson for a second opinion regarding the approach to the recurrent ventral hernia repair (possible eTEP).    Thank you for the opportunity to care for this patient. Please do not hesitate to contact me in the event that you have any questions or concerns about the care of this patient.  Sincerely,    Annie Guzman M.D.

## 2024-02-05 NOTE — DATA REVIEWED
[FreeTextEntry1] : CT abdomen/pelvis (4/3/2013) - No evidence of ureteral stone or hydronephrosis.  Hepatomegaly. Limited evaluation of the mostly contracted gallbladder showing gallstones.  No pericholecystic inflammatory changes. Additional diagnostic information regarding the gallbladder may be obtained with a right upper quadrant ultrasound following overnight fasting, if clinically indicated.  Enlarged fibroid uterus. Endometrial thickening cannot be excluded.  Follow up nonemergent pelvic ultrasound is recommended when clinically appropriate.  MRI abdomen (4/8/2013) - the tip of the gallbladder is contracted, representing a phrygian cap.   Adenomyosis was visible in this region on the ultrasound. Both CT scans demonstrated a small gallstone in this region. The stone is difficult to appreciate on the MRI. The common bile duct is normal in diameter. No filling defects are seen within the common bile duct. The pancreatic duct is normal in diameter and inserts onto the major papilla. There is a 2.0 x 1.5 cm left adrenal nodule which cannot be further characterized since the study was focused on the gallbladder. The right adrenal is normal in appearance. Tiny renal cysts are visible.  HIDA (4/9/2013) - prompt uptake of radiopharmaceutical by the liver with prompt excretion into the biliary tree, bowel and gallbladder. Normal hepatobiliary scan. There is no evidence of acute cholecystitis.  US abdomen (1/30/2016) - Gallbladder adenomyomatosis.  MRI pelvis (5/9/2022) - small fat-containing umbilical hernia trace physiologic free cul-de-sac fluid.  Enlarged bulbous uterus with markedly severe adenomyosis. A few scattered discrete small fibroids are noted, the largest 2.6 cm, the others <1.5 cm.  Intrinsically unremarkable endometrium and ovaries given the lack of IV contrast.  US abdomen (3/19/2023) - normal caliber common bile duct, measuring 4 mm.  Adenomyomatosis of the gallbladder. No stones or gallbladder wall thickening. No sonographic Pagan's sign.  No acute sonographic abnormality.  Redemonstrated gallbladder adenomyomatosis.

## 2024-02-05 NOTE — ASSESSMENT
[FreeTextEntry1] : Ms. Solomon is a 48-year-old woman with a recurrent ventral hernia and right upper quadrant pain of unclear etiology.  It is unclear whether a cholecystectomy would improve her right-sided pain, as the symptoms do not entirely go along with a gallbladder etiology.  As she has not had a CT abdomen/pelvis recently to evaluate the hernia, we will perform a CT abdomen/pelvis to further evaluate the hernia.  We discussed the risks, benefits, and alternatives to surgery, including but not limited to bleeding, infection, mesh complications, hernia recurrence, and damage to the surrounding structures, and how BMI, smoking, and diabetes will impact these risks.  We discussed the need for significant weight loss (to a BMI of under 35) prior to repair, especially since this is a recurrent hernia, and the need for smoking cessation for 8 weeks prior to surgery, as well as the need for good glycemic control with a target HgA1C of 7.2 or less.  She was also referred to Dr. Lou Henson for a second opinion regarding the approach to the recurrent ventral hernia repair (possible eTEP).

## 2024-02-05 NOTE — REASON FOR VISIT
[Consultation] : a consultation visit [FreeTextEntry1] : Consultation requested by: Adriana Abbasi NP

## 2024-02-27 ENCOUNTER — APPOINTMENT (OUTPATIENT)
Dept: BARIATRICS | Facility: CLINIC | Age: 49
End: 2024-02-27

## 2025-03-05 NOTE — ED ADULT NURSE NOTE - NSSEPSISSUSPECTED_ED_A_ED
Copied from CRM #[id number]. Topic: MW Medication/Rx - MW Rx Refill  >> Mar 4, 2025  3:35 PM Tegan KAMINSKI wrote:  aNyely Bruce called to Change  -Medication  Selected 'Wrap Up CRM' and created new Telephone Encounter after clicking 'Convert to Clinical Call'. Selected reason for call 'Medication Issue'.Sent Med template and routed as routine priority per Clinician KB page to appropriate clinician pool. Readback full message.-- DO NOT REPLY / DO NOT REPLY ALL --  -- This inbox is not monitored. If this was sent to the wrong provider or department, reroute message to P E[facility] Reroute pool. --  -- Message is from Engagement Center Operations (ECO) --      Message Type:  Change or Request for New Medication   Reason:  Change medication request:  Request to change from medication Humalog and Basaglar to Lantus and Novalog because Insurance will not cover the Humalog and Basaglar.  Preferred pharmacy verified and selected.   [facility] [address] [place] [address]    Patient has been advised the message will be addressed within 2-3 business days.              
Pt has upcoming appt with provider tomorrow, will address then and send in any formulary changes.   
No